# Patient Record
Sex: MALE
[De-identification: names, ages, dates, MRNs, and addresses within clinical notes are randomized per-mention and may not be internally consistent; named-entity substitution may affect disease eponyms.]

---

## 2019-06-19 ENCOUNTER — APPOINTMENT (OUTPATIENT)
Dept: PHYSICAL MEDICINE AND REHAB | Facility: CLINIC | Age: 34
End: 2019-06-19
Payer: COMMERCIAL

## 2019-06-19 VITALS — BODY MASS INDEX: 24.52 KG/M2 | WEIGHT: 185 LBS | HEIGHT: 73 IN

## 2019-06-19 DIAGNOSIS — Z86.39 PERSONAL HISTORY OF OTHER ENDOCRINE, NUTRITIONAL AND METABOLIC DISEASE: ICD-10-CM

## 2019-06-19 DIAGNOSIS — M51.26 OTHER INTERVERTEBRAL DISC DISPLACEMENT, LUMBAR REGION: ICD-10-CM

## 2019-06-19 PROBLEM — Z00.00 ENCOUNTER FOR PREVENTIVE HEALTH EXAMINATION: Status: ACTIVE | Noted: 2019-06-19

## 2019-06-19 PROCEDURE — 99204 OFFICE O/P NEW MOD 45 MIN: CPT

## 2019-06-19 RX ORDER — MELOXICAM 7.5 MG/1
7.5 TABLET ORAL TWICE DAILY
Qty: 60 | Refills: 0 | Status: ACTIVE | COMMUNITY
Start: 2019-06-19 | End: 1900-01-01

## 2019-06-19 RX ORDER — SODIUM FLUORIDE AND POTASSIUM NITRATE 5.8; 57.5 MG/ML; MG/ML
1.1-5 GEL, DENTIFRICE DENTAL
Qty: 100 | Refills: 0 | Status: ACTIVE | COMMUNITY
Start: 2019-02-12

## 2019-06-19 NOTE — PHYSICAL EXAM
[FreeTextEntry1] : JAMES is a 34 year male \par Constitutional: healthy appearing, NAD, and normal body habitus\par \par LUMBAR\par ROM: flexion to 30 deg, ext normal\par \par Gait: normal\par \par Inspection: no erythema, warmth\par Spine: no TTP in spinous process, SI joint, sacrum\par Bony palpation: no TTP in GT\par \par Soft tissue palpation hip: no TTP in gluteus monica, medius\par Soft tissue palpation of spine: no TTP in lumbar paraspinals\par \par 5/5 bilateral HF, KE, DF, PF \par sensation intact in bilat LE\par reflexes: knee and ankle 2+ bilat\par \par Special tests: neg seated SLR; pop angle 40 deg\par \par

## 2019-06-19 NOTE — REVIEW OF SYSTEMS
[Diarrhea] : diarrhea [Negative] : Heme/Lymph [Abdominal Pain] : no abdominal pain [Nausea] : no nausea [Constipation] : no constipation

## 2019-06-19 NOTE — ASSESSMENT
[FreeTextEntry1] : Needs to foam roll and stretch hamstrings and improve posture.  Limit sitting.  Gave exercises to start with.

## 2019-06-19 NOTE — HISTORY OF PRESENT ILLNESS
[FreeTextEntry1] : Location: back\par Quality: sharp\par Severity: 9/10\par Duration: few months, Dec 2018\par Timing: chronic\par Context: studying for boards\par Aggravating Factors: sitting, bending\par Alleviating Factors: nsaids\par Associated Symptoms: denies weight loss, fever, chills, change in bowel/bladder habits, redness, warmth, weakness, numbness/tingling, radiation down \par Prior Studies:\par